# Patient Record
Sex: MALE | Race: WHITE | NOT HISPANIC OR LATINO | Employment: FULL TIME | URBAN - METROPOLITAN AREA
[De-identification: names, ages, dates, MRNs, and addresses within clinical notes are randomized per-mention and may not be internally consistent; named-entity substitution may affect disease eponyms.]

---

## 2021-04-25 ENCOUNTER — APPOINTMENT (EMERGENCY)
Dept: RADIOLOGY | Facility: HOSPITAL | Age: 36
End: 2021-04-25
Payer: COMMERCIAL

## 2021-04-25 ENCOUNTER — HOSPITAL ENCOUNTER (EMERGENCY)
Facility: HOSPITAL | Age: 36
Discharge: HOME | End: 2021-04-25
Attending: EMERGENCY MEDICINE
Payer: COMMERCIAL

## 2021-04-25 VITALS
OXYGEN SATURATION: 100 % | HEIGHT: 72 IN | SYSTOLIC BLOOD PRESSURE: 112 MMHG | WEIGHT: 180 LBS | BODY MASS INDEX: 24.38 KG/M2 | HEART RATE: 80 BPM | TEMPERATURE: 98.6 F | RESPIRATION RATE: 18 BRPM | DIASTOLIC BLOOD PRESSURE: 71 MMHG

## 2021-04-25 DIAGNOSIS — R07.9 CHEST PAIN: Primary | ICD-10-CM

## 2021-04-25 LAB
ANION GAP SERPL CALCULATED.3IONS-SCNC: 6 MMOL/L (ref 4–13)
APTT PPP: 27 SECONDS (ref 23–37)
ATRIAL RATE: 82 BPM
ATRIAL RATE: 84 BPM
BASOPHILS # BLD AUTO: 0.06 THOUSANDS/ΜL (ref 0–0.1)
BASOPHILS NFR BLD AUTO: 0 % (ref 0–1)
BUN SERPL-MCNC: 16 MG/DL (ref 5–25)
CALCIUM SERPL-MCNC: 8.8 MG/DL (ref 8.3–10.1)
CHLORIDE SERPL-SCNC: 101 MMOL/L (ref 100–108)
CO2 SERPL-SCNC: 30 MMOL/L (ref 21–32)
CREAT SERPL-MCNC: 0.89 MG/DL (ref 0.6–1.3)
D DIMER PPP FEU-MCNC: 0.27 UG/ML FEU
EOSINOPHIL # BLD AUTO: 0.03 THOUSAND/ΜL (ref 0–0.61)
EOSINOPHIL NFR BLD AUTO: 0 % (ref 0–6)
ERYTHROCYTE [DISTWIDTH] IN BLOOD BY AUTOMATED COUNT: 12.5 % (ref 11.6–15.1)
GFR SERPL CREATININE-BSD FRML MDRD: 112 ML/MIN/1.73SQ M
GLUCOSE SERPL-MCNC: 105 MG/DL (ref 65–140)
HCT VFR BLD AUTO: 46.1 % (ref 36.5–49.3)
HGB BLD-MCNC: 15.6 G/DL (ref 12–17)
IMM GRANULOCYTES # BLD AUTO: 0.06 THOUSAND/UL (ref 0–0.2)
IMM GRANULOCYTES NFR BLD AUTO: 0 % (ref 0–2)
INR PPP: 0.94 (ref 0.84–1.19)
LYMPHOCYTES # BLD AUTO: 0.84 THOUSANDS/ΜL (ref 0.6–4.47)
LYMPHOCYTES NFR BLD AUTO: 6 % (ref 14–44)
MAGNESIUM SERPL-MCNC: 1.9 MG/DL (ref 1.6–2.6)
MCH RBC QN AUTO: 29.9 PG (ref 26.8–34.3)
MCHC RBC AUTO-ENTMCNC: 33.8 G/DL (ref 31.4–37.4)
MCV RBC AUTO: 89 FL (ref 82–98)
MONOCYTES # BLD AUTO: 1.62 THOUSAND/ΜL (ref 0.17–1.22)
MONOCYTES NFR BLD AUTO: 11 % (ref 4–12)
NEUTROPHILS # BLD AUTO: 12.34 THOUSANDS/ΜL (ref 1.85–7.62)
NEUTS SEG NFR BLD AUTO: 83 % (ref 43–75)
NRBC BLD AUTO-RTO: 0 /100 WBCS
P AXIS: 55 DEGREES
P AXIS: 62 DEGREES
PLATELET # BLD AUTO: 160 THOUSANDS/UL (ref 149–390)
PMV BLD AUTO: 10 FL (ref 8.9–12.7)
POTASSIUM SERPL-SCNC: 4.4 MMOL/L (ref 3.5–5.3)
PR INTERVAL: 128 MS
PR INTERVAL: 146 MS
PROTHROMBIN TIME: 12.7 SECONDS (ref 11.6–14.5)
QRS AXIS: 62 DEGREES
QRS AXIS: 62 DEGREES
QRSD INTERVAL: 94 MS
QRSD INTERVAL: 96 MS
QT INTERVAL: 346 MS
QT INTERVAL: 358 MS
QTC INTERVAL: 404 MS
QTC INTERVAL: 423 MS
RBC # BLD AUTO: 5.21 MILLION/UL (ref 3.88–5.62)
SODIUM SERPL-SCNC: 137 MMOL/L (ref 136–145)
T WAVE AXIS: 47 DEGREES
T WAVE AXIS: 51 DEGREES
TROPONIN I SERPL-MCNC: <0.02 NG/ML
TROPONIN I SERPL-MCNC: <0.02 NG/ML
TSH SERPL DL<=0.05 MIU/L-ACNC: 0.41 UIU/ML (ref 0.36–3.74)
VENTRICULAR RATE: 82 BPM
VENTRICULAR RATE: 84 BPM
WBC # BLD AUTO: 14.95 THOUSAND/UL (ref 4.31–10.16)

## 2021-04-25 PROCEDURE — 96375 TX/PRO/DX INJ NEW DRUG ADDON: CPT

## 2021-04-25 PROCEDURE — 99284 EMERGENCY DEPT VISIT MOD MDM: CPT | Performed by: PHYSICIAN ASSISTANT

## 2021-04-25 PROCEDURE — 99285 EMERGENCY DEPT VISIT HI MDM: CPT

## 2021-04-25 PROCEDURE — 96374 THER/PROPH/DIAG INJ IV PUSH: CPT

## 2021-04-25 PROCEDURE — 85025 COMPLETE CBC W/AUTO DIFF WBC: CPT | Performed by: PHYSICIAN ASSISTANT

## 2021-04-25 PROCEDURE — 93005 ELECTROCARDIOGRAM TRACING: CPT

## 2021-04-25 PROCEDURE — 84484 ASSAY OF TROPONIN QUANT: CPT | Performed by: PHYSICIAN ASSISTANT

## 2021-04-25 PROCEDURE — 84443 ASSAY THYROID STIM HORMONE: CPT | Performed by: PHYSICIAN ASSISTANT

## 2021-04-25 PROCEDURE — 93010 ELECTROCARDIOGRAM REPORT: CPT | Performed by: INTERNAL MEDICINE

## 2021-04-25 PROCEDURE — 85610 PROTHROMBIN TIME: CPT | Performed by: PHYSICIAN ASSISTANT

## 2021-04-25 PROCEDURE — 85730 THROMBOPLASTIN TIME PARTIAL: CPT | Performed by: PHYSICIAN ASSISTANT

## 2021-04-25 PROCEDURE — 80048 BASIC METABOLIC PNL TOTAL CA: CPT | Performed by: PHYSICIAN ASSISTANT

## 2021-04-25 PROCEDURE — 85379 FIBRIN DEGRADATION QUANT: CPT | Performed by: PHYSICIAN ASSISTANT

## 2021-04-25 PROCEDURE — 96361 HYDRATE IV INFUSION ADD-ON: CPT

## 2021-04-25 PROCEDURE — 36415 COLL VENOUS BLD VENIPUNCTURE: CPT | Performed by: PHYSICIAN ASSISTANT

## 2021-04-25 PROCEDURE — 71045 X-RAY EXAM CHEST 1 VIEW: CPT

## 2021-04-25 PROCEDURE — 83735 ASSAY OF MAGNESIUM: CPT | Performed by: PHYSICIAN ASSISTANT

## 2021-04-25 RX ORDER — SODIUM CHLORIDE 9 MG/ML
125 INJECTION, SOLUTION INTRAVENOUS CONTINUOUS
Status: DISCONTINUED | OUTPATIENT
Start: 2021-04-25 | End: 2021-04-25 | Stop reason: HOSPADM

## 2021-04-25 RX ORDER — KETOROLAC TROMETHAMINE 30 MG/ML
15 INJECTION, SOLUTION INTRAMUSCULAR; INTRAVENOUS ONCE
Status: COMPLETED | OUTPATIENT
Start: 2021-04-25 | End: 2021-04-25

## 2021-04-25 RX ORDER — ASPIRIN 81 MG/1
81 TABLET, CHEWABLE ORAL ONCE
Status: COMPLETED | OUTPATIENT
Start: 2021-04-25 | End: 2021-04-25

## 2021-04-25 RX ORDER — ONDANSETRON 2 MG/ML
4 INJECTION INTRAMUSCULAR; INTRAVENOUS ONCE
Status: COMPLETED | OUTPATIENT
Start: 2021-04-25 | End: 2021-04-25

## 2021-04-25 RX ORDER — MORPHINE SULFATE 10 MG/ML
6 INJECTION, SOLUTION INTRAMUSCULAR; INTRAVENOUS ONCE
Status: COMPLETED | OUTPATIENT
Start: 2021-04-25 | End: 2021-04-25

## 2021-04-25 RX ADMIN — MORPHINE SULFATE 6 MG: 10 INJECTION INTRAVENOUS at 09:37

## 2021-04-25 RX ADMIN — KETOROLAC TROMETHAMINE 15 MG: 30 INJECTION, SOLUTION INTRAMUSCULAR at 10:36

## 2021-04-25 RX ADMIN — SODIUM CHLORIDE 125 ML/HR: 0.9 INJECTION, SOLUTION INTRAVENOUS at 09:38

## 2021-04-25 RX ADMIN — ASPIRIN 81 MG: 81 TABLET, CHEWABLE ORAL at 09:37

## 2021-04-25 RX ADMIN — ONDANSETRON 4 MG: 2 INJECTION INTRAMUSCULAR; INTRAVENOUS at 09:37

## 2021-04-25 NOTE — ED NOTES
Assumed care for break coverage     Jonathon Falk RN  04/25/21 2061 Was the patient seen in the last year in this department? Yes    Does patient have an active prescription for medications requested? No     Received Request Via: Pharmacy

## 2021-04-25 NOTE — ED PROVIDER NOTES
History  Chief Complaint   Patient presents with    Chest Pain     cp, sob since 0330 this morning       History provided by:  Patient  Chest Pain  Pain location:  L chest and R chest  Pain quality: pressure    Pain radiates to:  L shoulder and R shoulder  Pain radiates to the back: no    Pain severity:  Moderate  Onset quality:  Sudden  Duration:  6 hours  Timing:  Intermittent  Progression:  Waxing and waning  Chronicity:  New  Context: breathing, lifting, movement and at rest    Context: no drug use, not eating, not raising an arm, no stress and no trauma    Relieved by:  Nothing  Worsened by:  Deep breathing and movement  Ineffective treatments:  Rest  Associated symptoms: anxiety    Associated symptoms: no abdominal pain, no altered mental status, no anorexia, no back pain, no claudication, no cough, no diaphoresis, no dizziness, no dysphagia, no fatigue, no fever, no headache, no heartburn, no lower extremity edema, no nausea, no near-syncope, no numbness, no orthopnea, no palpitations, no PND, no shortness of breath and no syncope    Risk factors: smoking    Risk factors: no aortic disease, no birth control, no coronary artery disease, no diabetes mellitus, no Connie-Danlos syndrome, no high cholesterol, no hypertension, no immobilization, not male, no Marfan's syndrome, not obese, not pregnant, no prior DVT/PE and no surgery        None       History reviewed  No pertinent past medical history  History reviewed  No pertinent surgical history  History reviewed  No pertinent family history  I have reviewed and agree with the history as documented      E-Cigarette/Vaping    E-Cigarette Use Never User      E-Cigarette/Vaping Substances     Social History     Tobacco Use    Smoking status: Current Every Day Smoker     Packs/day: 0 50    Smokeless tobacco: Never Used   Substance Use Topics    Alcohol use: Never     Frequency: Never    Drug use: Never       Review of Systems   Constitutional: Negative for diaphoresis, fatigue and fever  HENT: Negative for trouble swallowing  Respiratory: Negative for cough and shortness of breath  Cardiovascular: Positive for chest pain  Negative for palpitations, orthopnea, claudication, syncope, PND and near-syncope  Gastrointestinal: Negative for abdominal pain, anorexia, heartburn and nausea  Musculoskeletal: Negative for back pain  Neurological: Negative for dizziness, numbness and headaches  Physical Exam  Physical Exam  Vitals signs and nursing note reviewed  Constitutional:       General: He is in acute distress  Appearance: He is well-developed and normal weight  He is not ill-appearing, toxic-appearing or diaphoretic  HENT:      Head: Normocephalic and atraumatic  Neck:      Musculoskeletal: Neck supple  Vascular: No hepatojugular reflux or JVD  Trachea: No tracheal deviation  Cardiovascular:      Rate and Rhythm: Normal rate and regular rhythm  Heart sounds: Normal heart sounds  Pulmonary:      Effort: Pulmonary effort is normal  No tachypnea or accessory muscle usage  Breath sounds: Normal breath sounds  Chest:      Chest wall: No tenderness or crepitus  Abdominal:      Palpations: Abdomen is soft  There is no hepatomegaly, splenomegaly or mass  Tenderness: There is no abdominal tenderness  There is no guarding or rebound  Musculoskeletal:      Right lower leg: No edema  Left lower leg: No edema  Lymphadenopathy:      Cervical: No cervical adenopathy  Skin:     General: Skin is warm  Capillary Refill: Capillary refill takes less than 2 seconds  Neurological:      General: No focal deficit present  Mental Status: He is alert and oriented to person, place, and time     Psychiatric:         Mood and Affect: Mood normal          Behavior: Behavior normal          Vital Signs  ED Triage Vitals   Temperature Pulse Respirations Blood Pressure SpO2   04/25/21 0859 04/25/21 0859 04/25/21 0859 04/25/21 0859 04/25/21 0859   98 6 °F (37 °C) 99 20 135/81 96 %      Temp Source Heart Rate Source Patient Position - Orthostatic VS BP Location FiO2 (%)   04/25/21 0859 04/25/21 0859 04/25/21 1025 04/25/21 1025 --   Oral Monitor Sitting Right arm       Pain Score       04/25/21 0859       8           Vitals:    04/25/21 0859 04/25/21 1025 04/25/21 1130   BP: 135/81 147/100 112/71   Pulse: 99 (!) 106 80   Patient Position - Orthostatic VS:  Sitting          Visual Acuity      ED Medications  Medications   aspirin chewable tablet 81 mg (81 mg Oral Given 4/25/21 0937)   morphine (PF) 10 mg/mL injection 6 mg (6 mg Intravenous Given 4/25/21 0937)   ondansetron (ZOFRAN) injection 4 mg (4 mg Intravenous Given 4/25/21 0937)   ketorolac (TORADOL) injection 15 mg (15 mg Intravenous Given 4/25/21 1036)       Diagnostic Studies  Results Reviewed     Procedure Component Value Units Date/Time    Troponin I [[id number]]  (Normal) Collected: 04/25/21 1225    Lab Status: Final result Specimen: Blood from Arm, Right Updated: 04/25/21 1251     Troponin I <0 02 ng/mL     Basic metabolic panel [[id number]] Collected: 04/25/21 0937    Lab Status: Final result Specimen: Blood from Arm, Left Updated: 04/25/21 1013     Sodium 137 mmol/L      Potassium 4 4 mmol/L      Chloride 101 mmol/L      CO2 30 mmol/L      ANION GAP 6 mmol/L      BUN 16 mg/dL      Creatinine 0 89 mg/dL      Glucose 105 mg/dL      Calcium 8 8 mg/dL      eGFR 112 ml/min/1 73sq m     Narrative:      [place] guidelines for Chronic Kidney Disease (CKD):     Stage 1 with normal or high GFR (GFR > 90 mL/min/1 73 square meters)    Stage 2 Mild CKD (GFR = 60-89 mL/min/1 73 square meters)    Stage 3A Moderate CKD (GFR = 45-59 mL/min/1 73 square meters)    Stage 3B Moderate CKD (GFR = 30-44 mL/min/1 73 square meters)    Stage 4 Severe CKD (GFR = 15-29 mL/min/1 73 square meters)    Stage 5 End Stage CKD (GFR <15 mL/min/1 73 square meters)  Note: GFR calculation is accurate only with a steady state creatinine    TSH [[id number]]  (Normal) Collected: 04/25/21 0937    Lab Status: Final result Specimen: Blood from Arm, Left Updated: 04/25/21 1013     TSH 3RD GENERATON 0 410 uIU/mL     Narrative:      Patients undergoing fluorescein dye angiography may retain small amounts of fluorescein in the body for 48-72 hours post procedure  Samples containing fluorescein can produce falsely depressed TSH values  If the patient had this procedure,a specimen should be resubmitted post fluorescein clearance        Magnesium [[id number]]  (Normal) Collected: 04/25/21 0937    Lab Status: Final result Specimen: Blood from Arm, Left Updated: 04/25/21 1013     Magnesium 1 9 mg/dL     Troponin I [[id number]]  (Normal) Collected: 04/25/21 0937    Lab Status: Final result Specimen: Blood from Arm, Left Updated: 04/25/21 1007     Troponin I <0 02 ng/mL     D-Dimer [[id number]]  (Normal) Collected: 04/25/21 0937    Lab Status: Final result Specimen: Blood from Arm, Left Updated: 04/25/21 1001     D-Dimer, Quant 0 27 ug/ml FEU     Protime-INR [[id number]]  (Normal) Collected: 04/25/21 0937    Lab Status: Final result Specimen: Blood from Arm, Left Updated: 04/25/21 0959     Protime 12 7 seconds      INR 0 94    APTT [[id number]]  (Normal) Collected: 04/25/21 0937    Lab Status: Final result Specimen: Blood from Arm, Left Updated: 04/25/21 0959     PTT 27 seconds     CBC and differential [[id number]]  (Abnormal) Collected: 04/25/21 0937    Lab Status: Final result Specimen: Blood from Arm, Left Updated: 04/25/21 0954     WBC 14 95 Thousand/uL      RBC 5 21 Million/uL      Hemoglobin 15 6 g/dL      Hematocrit 46 1 %      MCV 89 fL      MCH 29 9 pg      MCHC 33 8 g/dL      RDW 12 5 %      MPV 10 0 fL      Platelets 780 Thousands/uL      nRBC 0 /100 WBCs      Neutrophils Relative 83 %      Immat GRANS % 0 %      Lymphocytes Relative 6 %      Monocytes Relative 11 %      Eosinophils Relative 0 % Basophils Relative 0 %      Neutrophils Absolute 12 34 Thousands/µL      Immature Grans Absolute 0 06 Thousand/uL      Lymphocytes Absolute 0 84 Thousands/µL      Monocytes Absolute 1 62 Thousand/µL      Eosinophils Absolute 0 03 Thousand/µL      Basophils Absolute 0 06 Thousands/µL                  XR chest 1 view portable   ED Interpretation by Marion Andino PA-C (04/25 1030)   No acute cardiopulmonary disease                 Procedures  ECG 12 Lead Documentation Only    Date/Time: 4/25/2021 9:32 AM  Performed by: Marion Andino PA-C  Authorized by: Marion Andino PA-C     Indications / Diagnosis:  Chest pain  ECG reviewed by me, the ED Provider: yes    Patient location:  ED  Previous ECG:     Previous ECG:  Unavailable    Comparison to cardiac monitor: Yes    Interpretation:     Interpretation: abnormal    Rate:     ECG rate:  82    ECG rate assessment: normal    Rhythm:     Rhythm: sinus rhythm    Ectopy:     Ectopy: none    QRS:     QRS axis:  Normal    QRS intervals: Wide  Conduction:     Conduction: abnormal      Abnormal conduction comment:  Right intraventricular conduction delay  ST segments:     ST segments:  Normal  Comments:      No signs of acute ischemia    Independently interpreted by me  ECG 12 Lead Documentation Only    Date/Time: 4/25/2021 10:39 AM  Performed by: Marion Andino PA-C  Authorized by: Marion Andino PA-C     Indications / Diagnosis:  Chest pain  ECG reviewed by me, the ED Provider: yes    Patient location:  ED  Previous ECG:     Previous ECG:  Compared to current    Comparison ECG info:  April 25th at 9:04a m  Similarity:  No change    Comparison to cardiac monitor: Yes    Interpretation:     Interpretation: abnormal    Rate:     ECG rate:  84    ECG rate assessment: normal    Rhythm:     Rhythm: sinus rhythm    Ectopy:     Ectopy: none    QRS:     QRS axis:  Normal    QRS intervals:   Wide  Conduction:     Conduction: abnormal      Abnormal conduction comment:  Right intraventricular conduction delay  ST segments:     ST segments:  Normal  T waves:     T waves: normal    Comments:      No signs of acute ischemia    Independently interpreted by me  ECG 12 Lead Documentation Only    Date/Time: 4/25/2021 12:29 PM  Performed by: Josefa Campuzano PA-C  Authorized by: Josefa Campuzano PA-C     Indications / Diagnosis:  Repeat 3 hours, no pain  ECG reviewed by me, the ED Provider: yes    Patient location:  ED  Previous ECG:     Previous ECG:  Compared to current    Comparison ECG info:  12:24    Similarity:  No change    Comparison to cardiac monitor: Yes    Interpretation:     Interpretation: abnormal    Rate:     ECG rate:  84    ECG rate assessment: normal    Rhythm:     Rhythm: sinus rhythm    Ectopy:     Ectopy: none    QRS:     QRS axis:  Normal    QRS intervals: Wide  Conduction:     Conduction: abnormal      Abnormal conduction: incomplete RBBB    ST segments:     ST segments:  Normal  T waves:     T waves: normal    Comments:      No signs of acute ischemia    Independently interpreted by me             ED Course  ED Course as of Apr 25 1653   Sun Apr 25, 2021   1032 Patient is complaining of chest pain 10/10 area  For repeat EKG  Laboratory tests reviewed  Troponin is less than 0 02   D-dimers normal range  Chest x-ray does not show any acute cardiopulmonary disease  Will repeat EKG  Patient is refusing to give a urine at this time for the urine drug screen  1220 Pain relieved  We will check a delta troponin and an EKG and determine disposition                  HEART Risk Score      Most Recent Value   Heart Score Risk Calculator   History  1 Filed at: 04/25/2021 1102   ECG  1 Filed at: 04/25/2021 1102   Age  0 Filed at: 04/25/2021 1102   Risk Factors  0 Filed at: 04/25/2021 1102   Troponin  1 Filed at: 04/25/2021 1102   HEART Score  3 Filed at: 04/25/2021 1102                                    MDM  Number of Diagnoses or Management Options  Chest pain: new and requires workup     Amount and/or Complexity of Data Reviewed  Clinical lab tests: ordered and reviewed  Tests in the radiology section of CPT®: ordered and reviewed  Tests in the medicine section of CPT®: ordered and reviewed    Risk of Complications, Morbidity, and/or Mortality  Presenting problems: high  Diagnostic procedures: high  Management options: high  General comments: Patient presents to the emergency room complaining of chest pain that radiated to both shoulders  He was seen and evaluated  EKG was obtained which revealed an incomplete right bundle-branch block but no acute signs of ischemia  A chest x-ray demonstrated no acute cardiopulmonary disease  Laboratory studies were taken and were reviewed  A delta troponin with a repeat EKG demonstrated no change in the troponin  The troponin was less than 0 02  The EKG demonstrated no signs of ischemia  Patient was discharged home was instructed to follow up with his family physician  Patient Progress  Patient progress: stable      Disposition  Final diagnoses:   Chest pain     Time reflects when diagnosis was documented in both MDM as applicable and the Disposition within this note     Time User Action Codes Description Comment    4/25/2021 12:58 PM Daphne De La Torre Add [R07 9] Chest pain       ED Disposition     ED Disposition Condition Date/Time Comment    Discharge Stable Sun Apr 25, 2021 12:58 PM Tj Gifford discharge to home/self care              Follow-up Information     Follow up With Specialties Details Why Contact Info Additional Information    your Physician  Schedule an appointment as soon as possible for a visit        [facility] Emergency [facility]  As needed, If symptoms worsen [address] Emergency Department, [address], [place], [place], [address]          There are no discharge medications for this patient  No discharge procedures on file      PDMP Review     None          ED Provider  Electronically Signed by           Dalia Flores PA-C  04/25/21 5610

## 2021-04-25 NOTE — ED NOTES
Patient aware urine specimen needed, call bell within reach, bed low position, side rail up x [phone] [facility] Priscila, RN  04/25/21 2942

## 2021-04-25 NOTE — ED NOTES
Asked pt multiple times to provide urine sample, pt states he cannot provide one at this time        Matty Canchola RN  04/25/21 4670

## 2021-04-28 LAB
ATRIAL RATE: 79 BPM
P AXIS: 60 DEGREES
PR INTERVAL: 140 MS
QRS AXIS: 60 DEGREES
QRSD INTERVAL: 94 MS
QT INTERVAL: 362 MS
QTC INTERVAL: 415 MS
T WAVE AXIS: 52 DEGREES
VENTRICULAR RATE: 79 BPM

## 2021-04-28 PROCEDURE — 93010 ELECTROCARDIOGRAM REPORT: CPT | Performed by: INTERNAL MEDICINE
